# Patient Record
Sex: MALE | Race: WHITE | NOT HISPANIC OR LATINO | Employment: FULL TIME | URBAN - METROPOLITAN AREA
[De-identification: names, ages, dates, MRNs, and addresses within clinical notes are randomized per-mention and may not be internally consistent; named-entity substitution may affect disease eponyms.]

---

## 2020-06-09 ENCOUNTER — OFFICE VISIT (OUTPATIENT)
Dept: ADMISSIONS | Facility: MEDICAL CENTER | Age: 40
End: 2020-06-09
Attending: ORTHOPAEDIC SURGERY
Payer: COMMERCIAL

## 2020-06-09 DIAGNOSIS — Z01.812 PRE-OPERATIVE LABORATORY EXAMINATION: ICD-10-CM

## 2020-06-09 LAB — COVID ORDER STATUS COVID19: NORMAL

## 2020-06-09 PROCEDURE — C9803 HOPD COVID-19 SPEC COLLECT: HCPCS

## 2020-06-09 RX ORDER — BUPRENORPHINE AND NALOXONE 8; 2 MG/1; MG/1
FILM, SOLUBLE BUCCAL; SUBLINGUAL
COMMUNITY
End: 2020-06-09

## 2020-06-09 RX ORDER — TESTOSTERONE CYPIONATE 1000 MG/10ML
150 INJECTION, SOLUTION INTRAMUSCULAR
COMMUNITY

## 2020-06-09 RX ORDER — BUPRENORPHINE HYDROCHLORIDE AND NALOXONE HYDROCHLORIDE .7; .18 MG/1; MG/1
TABLET, ORALLY DISINTEGRATING SUBLINGUAL
COMMUNITY
End: 2020-06-09

## 2020-06-09 RX ORDER — IBUPROFEN 200 MG
200 TABLET ORAL EVERY 6 HOURS PRN
COMMUNITY
End: 2020-12-02

## 2020-06-09 RX ORDER — CLONIDINE HYDROCHLORIDE 0.1 MG/1
TABLET ORAL PRN
COMMUNITY
Start: 2020-05-17

## 2020-06-09 NOTE — OR NURSING
"Preadmit appointment: \" Preparing for your Procedure information\" sheet given to patient with verbal and written instructions. Patient instructed to continue prescribed medications through the day before surgery, instructed to take the following medications the day of surgery per anesthesia protocol: none.  Pt denies any diagnosis of HTN, pt states he was prescribed Clonidine when he was taken off Zubsolv but he currently will take 1/2 tab to help him sleep.  Pt states he has been off Zubsolv/ Suboxone since last summer. Pt to have covid testing as soon as he leaves preadmit,  Pt advised he needs to self isolate and to notify Dr. Mitchell if any symptoms arise and covid symptoms sheet given to pt  "

## 2020-06-11 ENCOUNTER — ANESTHESIA EVENT (OUTPATIENT)
Dept: SURGERY | Facility: MEDICAL CENTER | Age: 40
End: 2020-06-11
Payer: COMMERCIAL

## 2020-06-11 LAB
SARS-COV-2 RNA RESP QL NAA+PROBE: NOT DETECTED
SPECIMEN SOURCE: NORMAL

## 2020-06-12 ENCOUNTER — ANESTHESIA (OUTPATIENT)
Dept: SURGERY | Facility: MEDICAL CENTER | Age: 40
End: 2020-06-12
Payer: COMMERCIAL

## 2020-06-12 ENCOUNTER — HOSPITAL ENCOUNTER (OUTPATIENT)
Facility: MEDICAL CENTER | Age: 40
End: 2020-06-12
Attending: ORTHOPAEDIC SURGERY | Admitting: ORTHOPAEDIC SURGERY
Payer: COMMERCIAL

## 2020-06-12 VITALS
WEIGHT: 282.19 LBS | DIASTOLIC BLOOD PRESSURE: 74 MMHG | BODY MASS INDEX: 36.22 KG/M2 | RESPIRATION RATE: 18 BRPM | OXYGEN SATURATION: 96 % | SYSTOLIC BLOOD PRESSURE: 124 MMHG | HEIGHT: 74 IN | HEART RATE: 71 BPM | TEMPERATURE: 97.9 F

## 2020-06-12 PROCEDURE — 160002 HCHG RECOVERY MINUTES (STAT): Performed by: ORTHOPAEDIC SURGERY

## 2020-06-12 PROCEDURE — 700105 HCHG RX REV CODE 258: Performed by: ORTHOPAEDIC SURGERY

## 2020-06-12 PROCEDURE — 700111 HCHG RX REV CODE 636 W/ 250 OVERRIDE (IP): Performed by: ANESTHESIOLOGY

## 2020-06-12 PROCEDURE — 64415 NJX AA&/STRD BRCH PLXS IMG: CPT | Performed by: ORTHOPAEDIC SURGERY

## 2020-06-12 PROCEDURE — 501838 HCHG SUTURE GENERAL: Performed by: ORTHOPAEDIC SURGERY

## 2020-06-12 PROCEDURE — A9270 NON-COVERED ITEM OR SERVICE: HCPCS | Performed by: ANESTHESIOLOGY

## 2020-06-12 PROCEDURE — 160046 HCHG PACU - 1ST 60 MINS PHASE II: Performed by: ORTHOPAEDIC SURGERY

## 2020-06-12 PROCEDURE — 160035 HCHG PACU - 1ST 60 MINS PHASE I: Performed by: ORTHOPAEDIC SURGERY

## 2020-06-12 PROCEDURE — 700111 HCHG RX REV CODE 636 W/ 250 OVERRIDE (IP): Performed by: ORTHOPAEDIC SURGERY

## 2020-06-12 PROCEDURE — 160029 HCHG SURGERY MINUTES - 1ST 30 MINS LEVEL 4: Performed by: ORTHOPAEDIC SURGERY

## 2020-06-12 PROCEDURE — 160048 HCHG OR STATISTICAL LEVEL 1-5: Performed by: ORTHOPAEDIC SURGERY

## 2020-06-12 PROCEDURE — 700101 HCHG RX REV CODE 250: Performed by: ANESTHESIOLOGY

## 2020-06-12 PROCEDURE — 160036 HCHG PACU - EA ADDL 30 MINS PHASE I: Performed by: ORTHOPAEDIC SURGERY

## 2020-06-12 PROCEDURE — 700101 HCHG RX REV CODE 250

## 2020-06-12 PROCEDURE — 502581 HCHG PACK, SHOULDER ARTHROSCOPY: Performed by: ORTHOPAEDIC SURGERY

## 2020-06-12 PROCEDURE — 160009 HCHG ANES TIME/MIN: Performed by: ORTHOPAEDIC SURGERY

## 2020-06-12 PROCEDURE — 700102 HCHG RX REV CODE 250 W/ 637 OVERRIDE(OP): Performed by: ANESTHESIOLOGY

## 2020-06-12 PROCEDURE — 160025 RECOVERY II MINUTES (STATS): Performed by: ORTHOPAEDIC SURGERY

## 2020-06-12 PROCEDURE — 160041 HCHG SURGERY MINUTES - EA ADDL 1 MIN LEVEL 4: Performed by: ORTHOPAEDIC SURGERY

## 2020-06-12 RX ORDER — LIDOCAINE HYDROCHLORIDE 10 MG/ML
INJECTION, SOLUTION INFILTRATION; PERINEURAL
Status: COMPLETED
Start: 2020-06-12 | End: 2020-06-12

## 2020-06-12 RX ORDER — OXYCODONE HCL 5 MG/5 ML
10 SOLUTION, ORAL ORAL
Status: COMPLETED | OUTPATIENT
Start: 2020-06-12 | End: 2020-06-12

## 2020-06-12 RX ORDER — SODIUM CHLORIDE, SODIUM LACTATE, POTASSIUM CHLORIDE, CALCIUM CHLORIDE 600; 310; 30; 20 MG/100ML; MG/100ML; MG/100ML; MG/100ML
INJECTION, SOLUTION INTRAVENOUS CONTINUOUS
Status: DISCONTINUED | OUTPATIENT
Start: 2020-06-12 | End: 2020-06-12 | Stop reason: HOSPADM

## 2020-06-12 RX ORDER — MIDAZOLAM HYDROCHLORIDE 1 MG/ML
INJECTION INTRAMUSCULAR; INTRAVENOUS PRN
Status: DISCONTINUED | OUTPATIENT
Start: 2020-06-12 | End: 2020-06-12 | Stop reason: SURG

## 2020-06-12 RX ORDER — EPINEPHRINE 1 MG/ML(1)
AMPUL (ML) INJECTION
Status: DISCONTINUED | OUTPATIENT
Start: 2020-06-12 | End: 2020-06-12 | Stop reason: HOSPADM

## 2020-06-12 RX ORDER — HYDROMORPHONE HYDROCHLORIDE 1 MG/ML
0.4 INJECTION, SOLUTION INTRAMUSCULAR; INTRAVENOUS; SUBCUTANEOUS
Status: DISCONTINUED | OUTPATIENT
Start: 2020-06-12 | End: 2020-06-12 | Stop reason: HOSPADM

## 2020-06-12 RX ORDER — ONDANSETRON 2 MG/ML
4 INJECTION INTRAMUSCULAR; INTRAVENOUS
Status: DISCONTINUED | OUTPATIENT
Start: 2020-06-12 | End: 2020-06-12 | Stop reason: HOSPADM

## 2020-06-12 RX ORDER — MEPERIDINE HYDROCHLORIDE 25 MG/ML
12.5 INJECTION INTRAMUSCULAR; INTRAVENOUS; SUBCUTANEOUS
Status: DISCONTINUED | OUTPATIENT
Start: 2020-06-12 | End: 2020-06-12 | Stop reason: HOSPADM

## 2020-06-12 RX ORDER — HYDRALAZINE HYDROCHLORIDE 20 MG/ML
5 INJECTION INTRAMUSCULAR; INTRAVENOUS
Status: DISCONTINUED | OUTPATIENT
Start: 2020-06-12 | End: 2020-06-12 | Stop reason: HOSPADM

## 2020-06-12 RX ORDER — LIDOCAINE HYDROCHLORIDE 20 MG/ML
INJECTION, SOLUTION EPIDURAL; INFILTRATION; INTRACAUDAL; PERINEURAL PRN
Status: DISCONTINUED | OUTPATIENT
Start: 2020-06-12 | End: 2020-06-12 | Stop reason: SURG

## 2020-06-12 RX ORDER — HALOPERIDOL 5 MG/ML
1 INJECTION INTRAMUSCULAR
Status: DISCONTINUED | OUTPATIENT
Start: 2020-06-12 | End: 2020-06-12 | Stop reason: HOSPADM

## 2020-06-12 RX ORDER — HYDROMORPHONE HYDROCHLORIDE 1 MG/ML
0.5 INJECTION, SOLUTION INTRAMUSCULAR; INTRAVENOUS; SUBCUTANEOUS
Status: DISCONTINUED | OUTPATIENT
Start: 2020-06-12 | End: 2020-06-12 | Stop reason: HOSPADM

## 2020-06-12 RX ORDER — BUPIVACAINE HYDROCHLORIDE 2.5 MG/ML
INJECTION, SOLUTION EPIDURAL; INFILTRATION; INTRACAUDAL PRN
Status: DISCONTINUED | OUTPATIENT
Start: 2020-06-12 | End: 2020-06-12 | Stop reason: SURG

## 2020-06-12 RX ORDER — MIDAZOLAM HYDROCHLORIDE 1 MG/ML
1 INJECTION INTRAMUSCULAR; INTRAVENOUS
Status: DISCONTINUED | OUTPATIENT
Start: 2020-06-12 | End: 2020-06-12 | Stop reason: HOSPADM

## 2020-06-12 RX ORDER — DIPHENHYDRAMINE HYDROCHLORIDE 50 MG/ML
12.5 INJECTION INTRAMUSCULAR; INTRAVENOUS
Status: DISCONTINUED | OUTPATIENT
Start: 2020-06-12 | End: 2020-06-12 | Stop reason: HOSPADM

## 2020-06-12 RX ORDER — OXYCODONE HCL 5 MG/5 ML
5 SOLUTION, ORAL ORAL
Status: COMPLETED | OUTPATIENT
Start: 2020-06-12 | End: 2020-06-12

## 2020-06-12 RX ORDER — CEFAZOLIN SODIUM 1 G/3ML
INJECTION, POWDER, FOR SOLUTION INTRAMUSCULAR; INTRAVENOUS PRN
Status: DISCONTINUED | OUTPATIENT
Start: 2020-06-12 | End: 2020-06-12 | Stop reason: SURG

## 2020-06-12 RX ORDER — HYDROMORPHONE HYDROCHLORIDE 1 MG/ML
0.2 INJECTION, SOLUTION INTRAMUSCULAR; INTRAVENOUS; SUBCUTANEOUS
Status: DISCONTINUED | OUTPATIENT
Start: 2020-06-12 | End: 2020-06-12 | Stop reason: HOSPADM

## 2020-06-12 RX ORDER — DEXAMETHASONE SODIUM PHOSPHATE 4 MG/ML
INJECTION, SOLUTION INTRA-ARTICULAR; INTRALESIONAL; INTRAMUSCULAR; INTRAVENOUS; SOFT TISSUE PRN
Status: DISCONTINUED | OUTPATIENT
Start: 2020-06-12 | End: 2020-06-12 | Stop reason: SURG

## 2020-06-12 RX ORDER — ONDANSETRON 2 MG/ML
INJECTION INTRAMUSCULAR; INTRAVENOUS PRN
Status: DISCONTINUED | OUTPATIENT
Start: 2020-06-12 | End: 2020-06-12 | Stop reason: SURG

## 2020-06-12 RX ADMIN — PROPOFOL 200 MG: 10 INJECTION, EMULSION INTRAVENOUS at 13:53

## 2020-06-12 RX ADMIN — LIDOCAINE HYDROCHLORIDE 100 MG: 20 INJECTION, SOLUTION EPIDURAL; INFILTRATION; INTRACAUDAL; PERINEURAL at 13:53

## 2020-06-12 RX ADMIN — Medication 0.5 ML: at 13:29

## 2020-06-12 RX ADMIN — FENTANYL CITRATE 100 MCG: 50 INJECTION, SOLUTION INTRAMUSCULAR; INTRAVENOUS at 13:53

## 2020-06-12 RX ADMIN — ONDANSETRON 4 MG: 2 INJECTION INTRAMUSCULAR; INTRAVENOUS at 14:45

## 2020-06-12 RX ADMIN — CEFAZOLIN 3 G: 1 INJECTION, POWDER, FOR SOLUTION INTRAVENOUS at 13:49

## 2020-06-12 RX ADMIN — FENTANYL CITRATE 50 MCG: 50 INJECTION INTRAMUSCULAR; INTRAVENOUS at 15:23

## 2020-06-12 RX ADMIN — DEXAMETHASONE SODIUM PHOSPHATE 2 MG: 4 INJECTION, SOLUTION INTRAMUSCULAR; INTRAVENOUS at 13:40

## 2020-06-12 RX ADMIN — BUPIVACAINE HYDROCHLORIDE 30 ML: 2.5 INJECTION, SOLUTION EPIDURAL; INFILTRATION; INTRACAUDAL; PERINEURAL at 13:40

## 2020-06-12 RX ADMIN — OXYCODONE HYDROCHLORIDE 10 MG: 5 SOLUTION ORAL at 15:22

## 2020-06-12 RX ADMIN — LIDOCAINE HYDROCHLORIDE 0.5 ML: 10 INJECTION, SOLUTION INFILTRATION; PERINEURAL at 13:29

## 2020-06-12 RX ADMIN — MIDAZOLAM HYDROCHLORIDE 2 MG: 1 INJECTION, SOLUTION INTRAMUSCULAR; INTRAVENOUS at 13:38

## 2020-06-12 RX ADMIN — FENTANYL CITRATE 100 MCG: 50 INJECTION, SOLUTION INTRAMUSCULAR; INTRAVENOUS at 14:12

## 2020-06-12 RX ADMIN — SODIUM CHLORIDE, POTASSIUM CHLORIDE, SODIUM LACTATE AND CALCIUM CHLORIDE: 600; 310; 30; 20 INJECTION, SOLUTION INTRAVENOUS at 14:45

## 2020-06-12 RX ADMIN — SODIUM CHLORIDE, POTASSIUM CHLORIDE, SODIUM LACTATE AND CALCIUM CHLORIDE: 600; 310; 30; 20 INJECTION, SOLUTION INTRAVENOUS at 13:39

## 2020-06-12 ASSESSMENT — PAIN SCALES - GENERAL: PAIN_LEVEL: 0

## 2020-06-12 NOTE — ANESTHESIA PROCEDURE NOTES
Peripheral Block    Date/Time: 6/12/2020 1:38 PM  Performed by: Rg Clarke M.D.  Authorized by: Rg Clarke M.D.     Patient Location:  Pre-op  Start Time:  6/12/2020 1:38 PM  End Time:  6/12/2020 1:42 PM  Reason for Block: at surgeon's request and post-op pain management    patient identified, IV checked, site marked, risks and benefits discussed, surgical consent, monitors and equipment checked, pre-op evaluation and timeout performed    Patient Position:  Sitting  Prep: ChloraPrep    Monitoring:  Heart rate, continuous pulse ox and cardiac monitor  Block Region:  Upper Extremity  Upper Extremity - Block Type:  BRACHIAL PLEXUS block, Supraclavicular approach    Laterality:  Right  Procedures: ultrasound guided  Image captured, interpreted and electronically stored.  Local Infiltration:  Lidocaine  Strength:  1 %  Dose:  3 ml  Block Type:  Single-shot  Needle Length:  100mm  Needle Gauge:  21 G  Needle Localization:  Ultrasound guidance  Injection Assessment:  Negative aspiration for heme, no paresthesia on injection, incremental injection and local visualized surrounding nerve on ultrasound  Evidence of intravascular injection: No     US Guided Supraclavicular Brachial Plexus Block    US transducer placed cephalad and parallel to clavicle in angle to view the subclavian artery with the brachial plexus lateral and superficial to the artery. Needle inserted lateral to the transducer in-plane and advanced with the needle tip visualized continually into the perineural position. After negative aspiration, LA injected without resistance.

## 2020-06-12 NOTE — ANESTHESIA PROCEDURE NOTES
Airway    Date/Time: 6/12/2020 1:54 PM  Performed by: Rg Clarke M.D.  Authorized by: Rg Clarke M.D.     Location:  OR  Urgency:  Elective  Indications for Airway Management:  Anesthesia      Spontaneous Ventilation: absent    Sedation Level:  Deep  Preoxygenated: Yes    Final Airway Type:  Supraglottic airway  Final Supraglottic Airway:  Standard LMA    SGA Size:  4  Number of Attempts at Approach:  1   Atraumatic insertion, good seal

## 2020-06-12 NOTE — OR NURSING
1455 Into pacu via gurney, sedated, no airway but anesthesia put oral airway in red for pt breathing ease. No response from pt with this action. Rt shoulder dressing c/d/i, simple sling inplace, fingers pink and warm with cap refill time q 3 sec return, head or gurney elevated to 30 degrees.ice to shoulder   1505 aroused spontaneously airway removed without incident.   1515 tolerating water without incident head of gurney hi fowlers.    1520 medicated for pain oral oxycodone and fentanyl iv.   1540 resting comfortably looking out the window   1605 meet criteria for dc pacu

## 2020-06-12 NOTE — ANESTHESIA TIME REPORT
Anesthesia Start and Stop Event Times     Date Time Event    6/12/2020 1334 Ready for Procedure     1349 Anesthesia Start     1457 Anesthesia Stop        Responsible Staff  06/12/20    Name Role Begin End    Rg Clarke M.D. Anesth 1349 1453        Preop Diagnosis (Free Text):  Pre-op Diagnosis     BICEPS TENDINITIS RIGHT        Preop Diagnosis (Codes):    Post op Diagnosis  Impingement syndrome of right shoulder      Premium Reason  Non-Premium    Comments:

## 2020-06-12 NOTE — DISCHARGE INSTRUCTIONS
ACTIVITY: Rest and take it easy for the first 24 hours.  A responsible adult is recommended to remain with you during that time.  It is normal to feel sleepy.  We encourage you to not do anything that requires balance, judgment or coordination.    MILD FLU-LIKE SYMPTOMS ARE NORMAL. YOU MAY EXPERIENCE GENERALIZED MUSCLE ACHES, THROAT IRRITATION, HEADACHE AND/OR SOME NAUSEA.    FOR 24 HOURS DO NOT:  Drive, operate machinery or run household appliances.  Drink beer or alcoholic beverages.   Make important decisions or sign legal documents.    SPECIAL INSTRUCTIONS: Post-Operative Instructions - Shoulder Arthroscopy       Dressing and wound care: Keep your shoulder dressing clean and dry after surgery.  Be aware that some leaking of blood or fluid from your dressing can occur and is normal. You may remove your dressing 3 days after the operation.  Notice that you have a single incision and/or several small incisions that have been closed with sutures. Cover each of these incisions with a light dressing or band-aids.  This keeps the surgical incisions clean, as well as preventing your clothes from spotting with blood or fluid.  Change band-aids or light dressing daily.     Shower / bathing: Keep the shoulder dry for 3 days after your surgery.  Then, you may shower. You may let soap and water run over skin incisions, but do not immerse your shoulder in water.  No swimming pools, hot tubs, or baths are recommended until at least 6 weeks after surgery.     Ice: Apply an ice pack to your shoulder (15 minutes on the shoulder, 15 minutes off the shoulder), as you feel necessary to help with the pain and swelling.   If you have a cold therapy, use liberally as directed.     Sling / Shoulder Immobilizer: The sling should be on except when bathing or doing your exercises.  You may also carefully remove the sling when awake and seated.  Be sure to support your forearm with a pillow so your shoulder remains in a relaxed and  comfortable position.  Please wear the sling while sleeping.     Activity: It is important to move your shoulder, as well as your elbow, wrist, and hand several times daily, starting the day after surgery.  You may do pendulum exercises with your operative arm starting the day after surgery.  Pendulum (dangling [place]) exercises are encouraged 3-5 times daily.  The sling will need to be removed for pendulum exercises.  You may loosen the sling to use your operative arm for light simple activities such as eating, brushing teeth, or writing/typing.  Nothing heavier than a cup of coffee in your hand on the operative side until otherwise recommended.     Pain medication: Take your pain medicine, as needed and prescribed.  Do not drive or operate machinery while taking narcotic pain medication.   You may start or resume anti-inflammatory medication (i.e. ibuprofen, naproxen) anytime after surgery, which should be taken with food to avoid stomach irritation.     Driving: You may drive as soon as you are off narcotic pain medication and feel comfortable behind the wheel.  Loosen or remove your sling, supporting your forearm on a pillow when you drive.  It is important for both hands to have access to the steering wheel for safety.     Aspirin: Take one low dose 81 mg aspirin starting the morning after surgery twice daily - one in the morning and one in the evening - for two weeks following surgery.       Problems:     If you are having problems with your shoulder (unexpected pain, excessive bleeding or discharge from your incisions, fevers/chills) do not hesitate to call the office or visit the nearest emergency room for evaluation.     Follow-up:     Make sure that you have an appointment 7-14 days following surgery.  Your stitches will be removed, and your procedure/rehabilitation will be discussed at that time.   Physical therapy may be prescribed at that time.         Rachel Mitchell MD   [facility]    [phone]   DIET: To avoid nausea, slowly advance diet as tolerated, avoiding spicy or greasy foods for the first day.  Add more substantial food to your diet according to your physician's instructions.  Babies can be fed formula or breast milk as soon as they are hungry.  INCREASE FLUIDS AND FIBER TO AVOID CONSTIPATION.        FOLLOW-UP APPOINTMENT:  A follow-up appointment should be arranged with your doctor in ***; call to schedule.    You should CALL YOUR PHYSICIAN if you develop:  Fever greater than 101 degrees F.  Pain not relieved by medication, or persistent nausea or vomiting.  Excessive bleeding (blood soaking through dressing) or unexpected drainage from the wound.  Extreme redness or swelling around the incision site, drainage of pus or foul smelling drainage.  Inability to urinate or empty your bladder within 8 hours.  Problems with breathing or chest pain.    You should call 911 if you develop problems with breathing or chest pain.  If you are unable to contact your doctor or surgical center, you should go to the nearest emergency room or urgent care center.  Physician's telephone #: Dr Mitchell    If any questions arise, call your doctor.  If your doctor is not available, please feel free to call the Surgical Center at [phone].  The Center is open Monday through Friday from 7AM to 7PM.  You can also call the [facility] HOTLINE open 24 hours/day, 7 days/week and speak to a nurse at [phone], or toll free at [phone].    A registered nurse may call you a few days after your surgery to see how you are doing after your procedure.    MEDICATIONS: Resume taking daily medication.  Take prescribed pain medication with food.  If no medication is prescribed, you may take non-aspirin pain medication if needed.  PAIN MEDICATION CAN BE VERY CONSTIPATING.  Take a stool softener or laxative such as senokot, pericolace, or milk of magnesia if needed.    Prescription given for prescriptions given  prior to surgery 1520 oxycodone and fentanyl 50    If your physician has prescribed pain medication that includes Acetaminophen (Tylenol), do not take additional Acetaminophen (Tylenol) while taking the prescribed medication.    Depression / Suicide Risk    As you are discharged from this Critical access hospital facility, it is important to learn how to keep safe from harming yourself.    Recognize the warning signs:  · Abrupt changes in personality, positive or negative- including increase in energy   · Giving away possessions  · Change in eating patterns- significant weight changes-  positive or negative  · Change in sleeping patterns- unable to sleep or sleeping all the time   · Unwillingness or inability to communicate  · Depression  · Unusual sadness, discouragement and loneliness  · Talk of wanting to die  · Neglect of personal appearance   · Rebelliousness- reckless behavior  · Withdrawal from people/activities they love  · Confusion- inability to concentrate     If you or a loved one observes any of these behaviors or has concerns about self-harm, here's what you can do:  · Talk about it- your feelings and reasons for harming yourself  · Remove any means that you might use to hurt yourself (examples: pills, rope, extension cords, firearm)  · Get professional help from the community (Mental Health, Substance Abuse, psychological counseling)  · Do not be alone:Call your Safe Contact- someone whom you trust who will be there for you.  · Call your local CRISIS HOTLINE 495-2265 or 892-097-5201  · Call your local Children's Mobile Crisis Response Team [place] (935) 176-7828 or www.eTruck  · Call the toll free National Suicide Prevention Hotlines   · National Suicide Prevention Lifeline 411-781-FRBG (9274)  National Hope Line Network 800-SUICIDE (008-1079)    Peripheral Nerve Block Discharge Instructions from Same Day Surgery and Inpatient :    What to Expect - Lower Extremity  · The block may cause you to  experience numbness and weakness in your {LEG LOCATION PNB:232119} on the same side as your surgery  · Numbness, tingling and / or weakness are all normal. For some people, this may be an unpleasant sensation  · These issues will be resolved when the local anesthetic wears off   · You may experience numbness and tingling in your thigh on the same side as your surgery if the block medicine was injected at your groin area  · Numbness will make it difficult to walk  · You may have problems with balance and walking so be very careful   · Follow your surgeon's direction regarding weight bearing on your surgical limb  · Be very careful with your numb limb  Precautions  · The numbness may affect your balance  · Be careful when walking or moving around  · Your leg may be weak: be very careful putting weight on it  · If your surgeon did not specify a time, you should not bear weight for 24 hours  · Be sure to ask for help when you need it  · It is better to have help than to fall and hurt yourself  What to Expect - Upper Extremity  · You may experience numbness and weakness in {ARM LOCATION PNB:634517}  on the same side as your surgery  · This is normal. For some people, this may be an unpleasant sensation. Be very careful with your numb limb  · Ask for help when you need it  Shoulder Surgery Side Effects  · In addition to numbness and weakness you may experience other symptoms  · Other nerves that are close to those nerves injected can also be affected by local anesthesia  · You may experience a hoarseness in your voice  · Your breathing may feel different  · You may also notice drooping of your eyelid, pupil constriction, and decreased sweating, on the side of your surgery  · All of these side effects are normal and will resolve when the local anesthetic wears off   Prevent Injury  · Protect the limb like a baby  · Beware of exposing your limb to extreme heat or cold or trauma  · The limb may be injured without you  "noticing because it is numb  · Keep the limb elevated whenever possible  · Do not sleep on the limb  · Change the position of the limb regularly  · Avoid putting pressure on your surgical limb  Pain Control  · The initial block on the day of surgery will make your extremity feel \"numb\"  · Any consecutive injection including prior to discharge from the hospital will make your extremity feel \"numb\"  · You may feel an aching or burning when the local anesthesia starts to wear off  · Take pain pills as prescribed by your surgeon  · Call your surgeon or anesthesiologist if you do not have adequate pain control  ·   "

## 2020-06-12 NOTE — ANESTHESIA PREPROCEDURE EVALUATION
HTN, prior GA without issues. Denies: MI/CHF/smoking/CVA/DM/CKD      Relevant Problems   No relevant active problems       Physical Exam    Airway   Mallampati: II  TM distance: >3 FB  Neck ROM: full       Cardiovascular - normal exam  Rhythm: regular  Rate: normal  (-) murmur     Dental - normal exam           Pulmonary - normal exam  Breath sounds clear to auscultation     Abdominal    Neurological - normal exam                 Anesthesia Plan    ASA 2       Plan - general and peripheral nerve block     Peripheral nerve block will be post-op pain control  Airway plan will be LMA        Induction: intravenous    Postoperative Plan: Postoperative administration of opioids is intended.    Pertinent diagnostic labs and testing reviewed    Informed Consent:    Anesthetic plan and risks discussed with patient.    Use of blood products discussed with: patient whom consented to blood products.

## 2020-06-12 NOTE — ANESTHESIA POSTPROCEDURE EVALUATION
Patient: Ozzy Boland    Procedure Summary     Date:  06/12/20 Room / Location:   OR  / SURGERY [facility]    Anesthesia Start:  1349 Anesthesia Stop:  1457    Procedures:       DECOMPRESSION, SHOULDER, ARTHROSCOPIC - SUBACROMIAL, LABRAL DEBRIDEMENT (Right Shoulder)      EXCISION, CLAVICLE, DISTAL - (Right Shoulder) Diagnosis:  (BICEPS TENDINITIS RIGHT)    Surgeon:  Rachel Mitchell M.D. Responsible Provider:  Rg Clarke M.D.    Anesthesia Type:  general, peripheral nerve block ASA Status:  2          Final Anesthesia Type: general, peripheral nerve block  Last vitals  BP   Blood Pressure: 136/77    Temp   36.6 °C (97.9 °F)    Pulse   Pulse: 65   Resp   16    SpO2   97 %      Anesthesia Post Evaluation    Patient location during evaluation: PACU  Patient participation: complete - patient participated  Level of consciousness: awake and alert  Pain score: 0    Airway patency: patent  Anesthetic complications: no  Cardiovascular status: hemodynamically stable  Respiratory status: acceptable  Hydration status: euvolemic    PONV: none           Nurse Pain Score: 0 (NPRS)

## 2020-06-12 NOTE — OR SURGEON
Immediate Post OP Note    PreOp Diagnosis: RIGHT shoulder impingement, AC joint OA, labral debridement    PostOp Diagnosis: SAME     Procedure(s): RIGHT  DECOMPRESSION, SHOULDER, ARTHROSCOPIC - SUBACROMIAL, LABRAL DEBRIDEMENT - Wound Class: Clean  EXCISION, CLAVICLE, DISTAL - - Wound Class: Clean    Surgeon(s):  Rachel Mitchell M.D.    Anesthesiologist/Type of Anesthesia:  Anesthesiologist: Rg Clarke M.D.  Anesthesia Technician: Terry Llanos/General    Surgical Staff:  Circulator: Analisa Welch R.N.  Scrub Person: Ana Marx  Private Scrub: BONNIE BrandtNIZAIAH    Specimens removed if any:  * No specimens in log *    Estimated Blood Loss: min    Findings: as above    Complications: none            6/12/2020 2:53 PM Rachel Mitchell M.D.

## 2020-06-12 NOTE — OR NURSING
1420- Patient dressed and resting in recliner chair. Reports tolerable soreness to right shoulder. Sling in place. Family called to bedside.  1640- Discharged to care of family.

## 2020-06-13 NOTE — OP REPORT
DATE OF SERVICE:  06/12/2020    PREOPERATIVE DIAGNOSES:  Right shoulder impingement syndrome,   acromioclavicular joint osteoarthritis, superior labrum anterior and posterior   lesion.    POSTOPERATIVE DIAGNOSES:  Right shoulder impingement syndrome,   acromioclavicular joint osteoarthritis, type 1 superior labrum anterior to   posterior lesion.    PROCEDURES:  Right shoulder arthroscopy, subacromial decompression,   arthroscopic distal clavicle excision, labral debridement.    SURGEON:  Rachel Mitchell MD.    ASSISTANT:  Brendan Ortega CFA.    ANESTHESIOLOGIST:  Rg Clarke MD.    TYPE OF ANESTHESIA:  General, with preoperative interscalene nerve block.    INTRAVENOUS FLUID:  One liter crystalloid.    ESTIMATED BLOOD LOSS:  Minimal.    DRAINS:  None.    SPECIMENS:  None.    COMPLICATIONS:  None.    IMPLANTS:  None.    REASON FOR PROCEDURE:  The patient is a 40-year-old male with a longstanding   history of right shoulder pain.  He has failed nonoperative measures.  An MRI   demonstrated rotator cuff impingement with a possible SLAP lesion.  We decided   to proceed with arthroscopy.    DESCRIPTION OF OPERATION:  The  patient was given a right interscalene nerve   block by the anesthesiologist before surgery.  Once back in the operating   room, a breathing tube was placed.  He was then placed in the lateral   decubitus position.  Care was taken to pad his axilla as well as all bony   prominences.  The right shoulder was then examined under anesthesia.  He was   noted to have good range of motion, without instability.  The right upper   extremity was prepped with ChloraPrep and draped in standard sterile fashion.    A posterior portal was established.  The arthroscope was inserted.  An   anterior portal was established and a probe was then inserted.  There was mild   fraying noted to the proximal biceps tendon.  There was no instability or   significant proximal biceps tendinopathy.  The superior labrum was  simply   debrided.  The undersurface of the rotator cuff was normal.  The glenohumeral   joint cartilage was normal.  The arthroscope was then inserted into the   subacromial space.  There was a copious amount of thickened, inflamed bursal   tissue.  There was a significant curve noted to the acromion, which was noted   to impinge upon the supraspinatus.  There was no rotator cuff tear, however.    The 5.5 mm resector was used to remove the anterior curve as well as lateral   edge.  Portals were switched.  Using a standard cutting block technique from   posterior to anterior, a subacromial decompression was completed.  This   created a nice smooth surface to the undersurface of the acromion.  Attention   was then turned to the rotator cuff below.  There was no rotator cuff tear   that was noted.  Next, attention was turned to the distal clavicle.  There was   a very tight acromioclavicular joint space with some arthritic change noted   at the distal clavicle.  The 5.5 mm resector was used to remove approximately   8 mm of bone off of the distal clavicle, taking care to visualize the entire   aspect of the distal clavicle.  It was undermined as well to treat any medial   outlet obstruction symptoms.  The lateral aspect of the acromioplasty was   completed.  The rotator cuff while scuffed, was not torn.  A near complete   subdeltoid bursectomy was performed.  All instruments were then removed.    Portals were closed with 3-0 nylon suture.  A sterile dressing was applied.    All drapes were removed and the arm was carefully taken out of traction and   placed into a shoulder abduction sling.  The patient was placed supine on a   stretcher and taken to recovery room in stable condition.       ____________________________________     MD NIEVES JONES / ANGELES    DD:  06/12/2020 15:09:23  DT:  06/12/2020 22:32:48    D#:  [id number]  Job#:  [id number]

## 2020-12-02 ENCOUNTER — OFFICE VISIT (OUTPATIENT)
Dept: URGENT CARE | Facility: CLINIC | Age: 40
End: 2020-12-02
Payer: COMMERCIAL

## 2020-12-02 ENCOUNTER — HOSPITAL ENCOUNTER (OUTPATIENT)
Facility: MEDICAL CENTER | Age: 40
End: 2020-12-02
Attending: PHYSICIAN ASSISTANT
Payer: COMMERCIAL

## 2020-12-02 VITALS
TEMPERATURE: 96.9 F | SYSTOLIC BLOOD PRESSURE: 152 MMHG | OXYGEN SATURATION: 97 % | HEIGHT: 74 IN | DIASTOLIC BLOOD PRESSURE: 92 MMHG | RESPIRATION RATE: 18 BRPM | BODY MASS INDEX: 35.29 KG/M2 | HEART RATE: 97 BPM | WEIGHT: 275 LBS

## 2020-12-02 DIAGNOSIS — B34.9 NONSPECIFIC SYNDROME SUGGESTIVE OF VIRAL ILLNESS: ICD-10-CM

## 2020-12-02 DIAGNOSIS — R03.0 ELEVATED BLOOD PRESSURE READING: ICD-10-CM

## 2020-12-02 DIAGNOSIS — R52 GENERALIZED BODY ACHES: ICD-10-CM

## 2020-12-02 DIAGNOSIS — R05.9 COUGH: ICD-10-CM

## 2020-12-02 PROCEDURE — U0003 INFECTIOUS AGENT DETECTION BY NUCLEIC ACID (DNA OR RNA); SEVERE ACUTE RESPIRATORY SYNDROME CORONAVIRUS 2 (SARS-COV-2) (CORONAVIRUS DISEASE [COVID-19]), AMPLIFIED PROBE TECHNIQUE, MAKING USE OF HIGH THROUGHPUT TECHNOLOGIES AS DESCRIBED BY CMS-2020-01-R: HCPCS

## 2020-12-02 PROCEDURE — 99203 OFFICE O/P NEW LOW 30 MIN: CPT | Performed by: PHYSICIAN ASSISTANT

## 2020-12-02 RX ORDER — IBUPROFEN 600 MG/1
600 TABLET ORAL EVERY 6 HOURS PRN
Qty: 30 TAB | Refills: 0 | Status: SHIPPED | OUTPATIENT
Start: 2020-12-02

## 2020-12-02 RX ORDER — DEXTROAMPHETAMINE SACCHARATE, AMPHETAMINE ASPARTATE, DEXTROAMPHETAMINE SULFATE AND AMPHETAMINE SULFATE 2.5; 2.5; 2.5; 2.5 MG/1; MG/1; MG/1; MG/1
TABLET ORAL
COMMUNITY
Start: 2020-11-17 | End: 2023-03-07

## 2020-12-02 RX ORDER — DEXTROMETHORPHAN HYDROBROMIDE AND PROMETHAZINE HYDROCHLORIDE 15; 6.25 MG/5ML; MG/5ML
5 SYRUP ORAL EVERY 4 HOURS PRN
Qty: 100 ML | Refills: 0 | Status: SHIPPED | OUTPATIENT
Start: 2020-12-02 | End: 2023-03-07

## 2020-12-02 ASSESSMENT — ENCOUNTER SYMPTOMS
HEMOPTYSIS: 0
WHEEZING: 0
MYALGIAS: 1
SORE THROAT: 0
RHINORRHEA: 1
FEVER: 1
SHORTNESS OF BREATH: 0
CHILLS: 0
COUGH: 1
HEADACHES: 1

## 2020-12-02 NOTE — PATIENT INSTRUCTIONS
COVID-19 results pending    *Return home and continue self-isolation until you get your test result back.  If positive: You will be called by [facility] staff.    · Stay home and continue self isolation until:  · At least ten (10) days have passed since symptoms first appeared AND  · At least 24 hours have passed from last fever without the use of fever-reducing medications AND  · Symptoms have improved (eg: cough or shortness of breath)    If negative: You will be getting a [employer] message or a letter from [facility].  · Stay home until you have no fever for 24 hours and your symptoms (cough and shortness of breath) have resolved.    You may call [facility] ER Discharge Culture Line at [phone] for results/questions.    *Even after the above are met, maintain social distance from others (at least 6 feet) and practice frequent hand washing.    *Wear a face mask in public places.

## 2020-12-02 NOTE — PROGRESS NOTES
"Subjective:   Ozzy Boland is a 40 y.o. male who presents for Headache (x4days, headache, fever, feels hazy, cough)        Cough  This is a new problem. The current episode started in the past 7 days (after 12hrs of hunting on saturday). The problem has been gradually worsening. The cough is non-productive. Associated symptoms include a fever (subjective and measured at work 100.4), headaches, myalgias, nasal congestion and rhinorrhea. Pertinent negatives include no chills, ear congestion, ear pain, hemoptysis, postnasal drip, sore throat, shortness of breath or wheezing. Associated symptoms comments: Fatigue, \"haziness\". The symptoms are aggravated by cold air. Treatments tried: nsaids. The treatment provided mild relief. His past medical history is significant for asthma.     Review of Systems   Constitutional: Positive for fever (subjective and measured at work 100.4). Negative for chills.   HENT: Positive for rhinorrhea. Negative for ear pain, postnasal drip and sore throat.    Respiratory: Positive for cough. Negative for hemoptysis, shortness of breath and wheezing.    Musculoskeletal: Positive for myalgias.   Neurological: Positive for headaches.       PMH:  has a past medical history of Arthritis and Unspecified asthma(493.90).  MEDS:   Current Outpatient Medications:   •  amphetamine-dextroamphetamine (ADDERALL) 10 MG Tab, TK 1 T PO QAM AND TK 1 T AT NOON FOR 30 DAYS, Disp: , Rfl:   •  promethazine-dextromethorphan (PROMETHAZINE-DM) 6.25-15 MG/5ML syrup, Take 5 mL by mouth every four hours as needed for Cough., Disp: 100 mL, Rfl: 0  •  ibuprofen (MOTRIN) 600 MG Tab, Take 1 Tab by mouth every 6 hours as needed., Disp: 30 Tab, Rfl: 0  •  cloNIDine (CATAPRES) 0.1 MG Tab, as needed. 1/2 tab pt reports initially taken when taken off Zubsolv but currently takes \"to help me sleep\" very occasionally, Disp: , Rfl:   •  Testosterone Cypionate 100 MG/ML Solution, 150 mg by Intramuscular route., Disp: , Rfl: " "  •  Turmeric Curcumin 500 MG Cap, every day., Disp: , Rfl:   •  diclofenac (SOLARAZE) 3 % gel, diclofenac 3 % topical gel  APPLY TO AFFECTED AREAS R SHOULDER Q4-6 hrs PRN, Disp: , Rfl:   •  NON SPECIFIED, every day. Beet root, Disp: , Rfl:   •  Flaxseed, Linseed, (FLAX SEEDS PO), Take  by mouth every day., Disp: , Rfl:   •  Probiotic Product (PROBIOTIC PO), Take  by mouth every day., Disp: , Rfl:   ALLERGIES:   Allergies   Allergen Reactions   • Fish Allergy Anaphylaxis     Tuna fish- throat swelling   • Shellfish Allergy Anaphylaxis   • Ketorolac Hives   • Tizanidine Hives   • Tramadol Itching and Hives     SURGHX:   Past Surgical History:   Procedure Laterality Date   • PB SHLDR ARTHROSCOP,PART ACROMIOPLAS Right 6/12/2020    Procedure: DECOMPRESSION, SHOULDER, ARTHROSCOPIC - SUBACROMIAL, LABRAL DEBRIDEMENT;  Surgeon: Rachel Mitchell M.D.;  Location: SURGERY [facility];  Service: Orthopedics   • CLAVICLE DISTAL EXCISION Right 6/12/2020    Procedure: EXCISION, CLAVICLE, DISTAL -;  Surgeon: Rachel Mitchell M.D.;  Location: SURGERY [facility];  Service: Orthopedics   • CARPAL TUNNEL RELEASE Right 2006   • KNEE ARTHROSCOPY Left 2002   • KNEE ARTHROSCOPY Right 2001, 2003    x2 right     SOCHX:  reports that he quit smoking about 4 years ago. His smoking use included cigars. He has a 15.00 pack-year smoking history. He quit smokeless tobacco use about 23 months ago.  His smokeless tobacco use included chew. He reports previous alcohol use. He reports current drug use.  FH: Family history was reviewed, no pertinent findings to report   Objective:   /92 (BP Location: Left arm, Patient Position: Sitting, BP Cuff Size: Adult)   Pulse 97   Temp 36.1 °C (96.9 °F) (Temporal)   Resp 18   Ht 1.88 m (6' 2\")   Wt 124.7 kg (275 lb)   SpO2 97%   BMI 35.31 kg/m²   Physical Exam  Vitals signs reviewed.   Constitutional:       General: He is not in acute distress.     Appearance: Normal appearance. He is " well-developed. He is not toxic-appearing.   HENT:      Head: Normocephalic and atraumatic.      Right Ear: Tympanic membrane, ear canal and external ear normal.      Left Ear: Tympanic membrane, ear canal and external ear normal.      Nose: Mucosal edema and rhinorrhea present. No congestion. Rhinorrhea is clear.      Mouth/Throat:      Lips: Pink.      Mouth: Mucous membranes are moist.      Pharynx: Oropharynx is clear.   Eyes:      General: Gaze aligned appropriately.   Cardiovascular:      Rate and Rhythm: Normal rate and regular rhythm.      Heart sounds: Normal heart sounds, S1 normal and S2 normal.   Pulmonary:      Effort: Pulmonary effort is normal. No respiratory distress.      Breath sounds: Normal breath sounds. No stridor. No decreased breath sounds, wheezing, rhonchi or rales.   Skin:     General: Skin is warm and dry.      Capillary Refill: Capillary refill takes less than 2 seconds.   Neurological:      Mental Status: He is alert and oriented to person, place, and time.      Comments: CN2-12 grossly intact   Psychiatric:         Speech: Speech normal.         Behavior: Behavior normal.           Assessment/Plan:   1. Nonspecific syndrome suggestive of viral illness  - COVID/SARS COV-2 PCR; Future    2. Cough  - promethazine-dextromethorphan (PROMETHAZINE-DM) 6.25-15 MG/5ML syrup; Take 5 mL by mouth every four hours as needed for Cough.  Dispense: 100 mL; Refill: 0  - COVID/SARS COV-2 PCR; Future    3. Generalized body aches  - ibuprofen (MOTRIN) 600 MG Tab; Take 1 Tab by mouth every 6 hours as needed.  Dispense: 30 Tab; Refill: 0  - COVID/SARS COV-2 PCR; Future    4. Elevated blood pressure reading    Other orders  - amphetamine-dextroamphetamine (ADDERALL) 10 MG Tab; TK 1 T PO QAM AND TK 1 T AT NOON FOR 30 DAYS    Discussed with patient signs and symptoms most likely are due to a viral etiology.     Test for COVID-19. We will call/message back for results and appropriate further instructions.  Instructed to sign up for MyChart if they have not already. Result will be released to MyChart application.   Symptomatic and supportive care:   Plenty of oral hydration and rest   Over the counter cough suppressant as directed.  Tylenol or ibuprofen for pain and fever as directed.   Saline nasal spray and Flonase as a decongestant.   Infection control measures at home. Stay away from people, Hand washing, covering sneeze/cough, disinfect surfaces.   Remain home from work, school, and other populated environments.     Differential diagnosis, natural history, supportive care, and indications for immediate follow-up discussed.

## 2020-12-03 DIAGNOSIS — R52 GENERALIZED BODY ACHES: ICD-10-CM

## 2020-12-03 DIAGNOSIS — R05.9 COUGH: ICD-10-CM

## 2020-12-03 DIAGNOSIS — B34.9 NONSPECIFIC SYNDROME SUGGESTIVE OF VIRAL ILLNESS: ICD-10-CM

## 2020-12-03 LAB — COVID ORDER STATUS COVID19: NORMAL

## 2020-12-04 LAB
SARS-COV-2 RNA RESP QL NAA+PROBE: DETECTED
SPECIMEN SOURCE: ABNORMAL

## 2023-02-28 ENCOUNTER — OFFICE VISIT (OUTPATIENT)
Dept: URGENT CARE | Facility: PHYSICIAN GROUP | Age: 43
End: 2023-02-28
Payer: COMMERCIAL

## 2023-02-28 VITALS
SYSTOLIC BLOOD PRESSURE: 130 MMHG | BODY MASS INDEX: 34.01 KG/M2 | OXYGEN SATURATION: 98 % | DIASTOLIC BLOOD PRESSURE: 88 MMHG | RESPIRATION RATE: 16 BRPM | HEIGHT: 74 IN | HEART RATE: 96 BPM | WEIGHT: 265 LBS | TEMPERATURE: 97.3 F

## 2023-02-28 DIAGNOSIS — R51.9 NONINTRACTABLE HEADACHE, UNSPECIFIED CHRONICITY PATTERN, UNSPECIFIED HEADACHE TYPE: ICD-10-CM

## 2023-02-28 DIAGNOSIS — M25.551 RIGHT HIP PAIN: ICD-10-CM

## 2023-02-28 DIAGNOSIS — S16.1XXA ACUTE STRAIN OF NECK MUSCLE, INITIAL ENCOUNTER: ICD-10-CM
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
## 2023-02-28 DIAGNOSIS — S39.012A STRAIN OF LUMBAR REGION, INITIAL ENCOUNTER: ICD-10-CM

## 2023-02-28 PROCEDURE — 99214 OFFICE O/P EST MOD 30 MIN: CPT | Performed by: PHYSICIAN ASSISTANT

## 2023-02-28 RX ORDER — DEXTROAMPHETAMINE SACCHARATE, AMPHETAMINE ASPARTATE, DEXTROAMPHETAMINE SULFATE AND AMPHETAMINE SULFATE 7.5; 7.5; 7.5; 7.5 MG/1; MG/1; MG/1; MG/1
1 TABLET ORAL 2 TIMES DAILY
COMMUNITY
Start: 2023-02-03

## 2023-02-28 RX ORDER — IBUPROFEN 600 MG/1
600 TABLET ORAL EVERY 6 HOURS PRN
Qty: 30 TABLET | Refills: 0 | Status: SHIPPED | OUTPATIENT
Start: 2023-02-28 | End: 2023-03-07

## 2023-02-28 ASSESSMENT — ENCOUNTER SYMPTOMS
VOMITING: 0
HEADACHES: 1
DIZZINESS: 0
NAUSEA: 0
MYALGIAS: 1
BACK PAIN: 1
NECK PAIN: 1
DOUBLE VISION: 0
BLURRED VISION: 0
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
## 2023-02-28 NOTE — LETTER
[facility] [place]  [address]  [place] [id number]     February 28, 2023    Patient: Ozzy Boland   YOB: 1980   Date of Visit: 2/28/2023       To Whom It May Concern:    Ozzy Boland was seen and treated in our department on 2/28/2023. I recommend light duty for the rest of the week.    Sincerely,     Billy Gonsalez P.A.-C.

## 2023-03-07 ENCOUNTER — OFFICE VISIT (OUTPATIENT)
Dept: URGENT CARE | Facility: PHYSICIAN GROUP | Age: 43
End: 2023-03-07
Payer: COMMERCIAL

## 2023-03-07 VITALS
HEART RATE: 68 BPM | TEMPERATURE: 97.8 F | SYSTOLIC BLOOD PRESSURE: 134 MMHG | OXYGEN SATURATION: 95 % | BODY MASS INDEX: 34.01 KG/M2 | WEIGHT: 265 LBS | RESPIRATION RATE: 20 BRPM | DIASTOLIC BLOOD PRESSURE: 80 MMHG | HEIGHT: 74 IN

## 2023-03-07 DIAGNOSIS — G44.311 INTRACTABLE ACUTE POST-TRAUMATIC HEADACHE: ICD-10-CM

## 2023-03-07 PROCEDURE — 99214 OFFICE O/P EST MOD 30 MIN: CPT | Performed by: PHYSICIAN ASSISTANT

## 2023-03-07 RX ORDER — TESTOSTERONE CYPIONATE 1000 MG/10ML
150 INJECTION, SOLUTION INTRAMUSCULAR
COMMUNITY
End: 2023-03-07

## 2023-03-07 ASSESSMENT — ENCOUNTER SYMPTOMS
VOMITING: 0
MEMORY LOSS: 1
WEAKNESS: 1
NUMBNESS: 1
HEADACHES: 1
BLURRED VISION: 1
DISORIENTATION: 1
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
## 2023-03-16 ENCOUNTER — TELEPHONE (OUTPATIENT)
Dept: HEALTH INFORMATION MANAGEMENT | Facility: OTHER | Age: 43
End: 2023-03-16
Payer: COMMERCIAL

## 2023-10-28 ENCOUNTER — OFFICE VISIT (OUTPATIENT)
Dept: URGENT CARE | Facility: PHYSICIAN GROUP | Age: 43
End: 2023-10-28
Payer: COMMERCIAL

## 2023-10-28 VITALS
DIASTOLIC BLOOD PRESSURE: 92 MMHG | RESPIRATION RATE: 16 BRPM | OXYGEN SATURATION: 95 % | TEMPERATURE: 98 F | SYSTOLIC BLOOD PRESSURE: 146 MMHG | HEART RATE: 71 BPM

## 2023-10-28 DIAGNOSIS — K04.7 DENTAL INFECTION: ICD-10-CM

## 2023-10-28 DIAGNOSIS — K08.89 PAIN, DENTAL: ICD-10-CM

## 2023-10-28 PROCEDURE — 3077F SYST BP >= 140 MM HG: CPT | Performed by: NURSE PRACTITIONER

## 2023-10-28 PROCEDURE — 3080F DIAST BP >= 90 MM HG: CPT | Performed by: NURSE PRACTITIONER

## 2023-10-28 PROCEDURE — 99213 OFFICE O/P EST LOW 20 MIN: CPT | Performed by: NURSE PRACTITIONER

## 2023-10-28 RX ORDER — HYDROCODONE BITARTRATE AND ACETAMINOPHEN 5; 325 MG/1; MG/1
1 TABLET ORAL EVERY 6 HOURS PRN
Qty: 12 TABLET | Refills: 0 | Status: SHIPPED | OUTPATIENT
Start: 2023-10-28 | End: 2023-10-31

## 2023-10-28 RX ORDER — AMOXICILLIN AND CLAVULANATE POTASSIUM 875; 125 MG/1; MG/1
1 TABLET, FILM COATED ORAL 2 TIMES DAILY
Qty: 14 TABLET | Refills: 0 | Status: SHIPPED | OUTPATIENT
Start: 2023-10-28 | End: 2023-11-04

## 2023-10-28 ASSESSMENT — VISUAL ACUITY: OU: 1

## 2023-10-28 ASSESSMENT — ENCOUNTER SYMPTOMS
CONSTITUTIONAL NEGATIVE: 1
FEVER: 0
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
## 2024-07-02 ENCOUNTER — OFFICE VISIT (OUTPATIENT)
Dept: URGENT CARE | Facility: PHYSICIAN GROUP | Age: 44
End: 2024-07-02
Payer: COMMERCIAL

## 2024-07-02 ENCOUNTER — HOSPITAL ENCOUNTER (EMERGENCY)
Facility: MEDICAL CENTER | Age: 44
End: 2024-07-02
Attending: EMERGENCY MEDICINE
Payer: COMMERCIAL

## 2024-07-02 ENCOUNTER — APPOINTMENT (OUTPATIENT)
Dept: RADIOLOGY | Facility: MEDICAL CENTER | Age: 44
End: 2024-07-02
Attending: EMERGENCY MEDICINE
Payer: COMMERCIAL

## 2024-07-02 VITALS
TEMPERATURE: 98.1 F | DIASTOLIC BLOOD PRESSURE: 101 MMHG | SYSTOLIC BLOOD PRESSURE: 197 MMHG | HEIGHT: 74 IN | BODY MASS INDEX: 35.29 KG/M2 | HEART RATE: 72 BPM | RESPIRATION RATE: 13 BRPM | WEIGHT: 275 LBS | OXYGEN SATURATION: 93 %

## 2024-07-02 VITALS
WEIGHT: 279.8 LBS | RESPIRATION RATE: 16 BRPM | BODY MASS INDEX: 35.91 KG/M2 | OXYGEN SATURATION: 98 % | HEIGHT: 74 IN | DIASTOLIC BLOOD PRESSURE: 90 MMHG | HEART RATE: 94 BPM | SYSTOLIC BLOOD PRESSURE: 168 MMHG | TEMPERATURE: 98.6 F

## 2024-07-02 DIAGNOSIS — R03.0 ELEVATED BLOOD PRESSURE READING: ICD-10-CM

## 2024-07-02 DIAGNOSIS — R07.89 CHEST PRESSURE: ICD-10-CM

## 2024-07-02 DIAGNOSIS — R07.89 CHEST TIGHTNESS: ICD-10-CM

## 2024-07-02 PROBLEM — Z87.09 HISTORY OF ASTHMA: Status: ACTIVE | Noted: 2018-06-21

## 2024-07-02 PROBLEM — Z98.890 STATUS POST MENISCECTOMY: Status: ACTIVE | Noted: 2018-06-21

## 2024-07-02 PROBLEM — F17.200 TOBACCO DEPENDENCE: Status: ACTIVE | Noted: 2018-06-21

## 2024-07-02 PROBLEM — Z87.39 HISTORY OF LOW BACK PAIN: Status: ACTIVE | Noted: 2018-06-21

## 2024-07-02 PROBLEM — G89.29 CHRONIC PAIN: Status: ACTIVE | Noted: 2019-02-13

## 2024-07-02 PROBLEM — R79.89 LOW TESTOSTERONE IN MALE: Status: ACTIVE | Noted: 2018-06-21

## 2024-07-02 LAB
ALBUMIN SERPL BCP-MCNC: 4.3 G/DL (ref 3.2–4.9)
ALBUMIN/GLOB SERPL: 1.4 G/DL
ALP SERPL-CCNC: 101 U/L (ref 30–99)
ALT SERPL-CCNC: 33 U/L (ref 2–50)
ANION GAP SERPL CALC-SCNC: 13 MMOL/L (ref 7–16)
AST SERPL-CCNC: 28 U/L (ref 12–45)
BASOPHILS # BLD AUTO: 1.1 % (ref 0–1.8)
BASOPHILS # BLD: 0.09 K/UL (ref 0–0.12)
BILIRUB SERPL-MCNC: 0.5 MG/DL (ref 0.1–1.5)
BUN SERPL-MCNC: 12 MG/DL (ref 8–22)
CALCIUM ALBUM COR SERPL-MCNC: 9.1 MG/DL (ref 8.5–10.5)
CALCIUM SERPL-MCNC: 9.3 MG/DL (ref 8.5–10.5)
CHLORIDE SERPL-SCNC: 101 MMOL/L (ref 96–112)
CO2 SERPL-SCNC: 26 MMOL/L (ref 20–33)
CREAT SERPL-MCNC: 1.22 MG/DL (ref 0.5–1.4)
EKG 4674: NORMAL
EKG IMPRESSION: NORMAL
EOSINOPHIL # BLD AUTO: 0.23 K/UL (ref 0–0.51)
EOSINOPHIL NFR BLD: 2.9 % (ref 0–6.9)
ERYTHROCYTE [DISTWIDTH] IN BLOOD BY AUTOMATED COUNT: 44.4 FL (ref 35.9–50)
GFR SERPLBLD CREATININE-BSD FMLA CKD-EPI: 74 ML/MIN/1.73 M 2
GLOBULIN SER CALC-MCNC: 3 G/DL (ref 1.9–3.5)
GLUCOSE SERPL-MCNC: 111 MG/DL (ref 65–99)
HCT VFR BLD AUTO: 48.3 % (ref 42–52)
HGB BLD-MCNC: 16.3 G/DL (ref 14–18)
IMM GRANULOCYTES # BLD AUTO: 0.01 K/UL (ref 0–0.11)
IMM GRANULOCYTES NFR BLD AUTO: 0.1 % (ref 0–0.9)
LYMPHOCYTES # BLD AUTO: 2.58 K/UL (ref 1–4.8)
LYMPHOCYTES NFR BLD: 32.6 % (ref 22–41)
MCH RBC QN AUTO: 29.6 PG (ref 27–33)
MCHC RBC AUTO-ENTMCNC: 33.7 G/DL (ref 32.3–36.5)
MCV RBC AUTO: 87.8 FL (ref 81.4–97.8)
MONOCYTES # BLD AUTO: 0.58 K/UL (ref 0–0.85)
MONOCYTES NFR BLD AUTO: 7.3 % (ref 0–13.4)
NEUTROPHILS # BLD AUTO: 4.42 K/UL (ref 1.82–7.42)
NEUTROPHILS NFR BLD: 56 % (ref 44–72)
NRBC # BLD AUTO: 0 K/UL
NRBC BLD-RTO: 0 /100 WBC (ref 0–0.2)
PLATELET # BLD AUTO: 312 K/UL (ref 164–446)
PMV BLD AUTO: 9.8 FL (ref 9–12.9)
POTASSIUM SERPL-SCNC: 3.5 MMOL/L (ref 3.6–5.5)
PROT SERPL-MCNC: 7.3 G/DL (ref 6–8.2)
RBC # BLD AUTO: 5.5 M/UL (ref 4.7–6.1)
SODIUM SERPL-SCNC: 140 MMOL/L (ref 135–145)
TROPONIN T SERPL-MCNC: 10 NG/L (ref 6–19)
TROPONIN T SERPL-MCNC: 11 NG/L (ref 6–19)
WBC # BLD AUTO: 7.9 K/UL (ref 4.8–10.8)

## 2024-07-02 PROCEDURE — 700111 HCHG RX REV CODE 636 W/ 250 OVERRIDE (IP): Mod: JZ | Performed by: EMERGENCY MEDICINE

## 2024-07-02 PROCEDURE — 36415 COLL VENOUS BLD VENIPUNCTURE: CPT

## 2024-07-02 PROCEDURE — 84484 ASSAY OF TROPONIN QUANT: CPT | Mod: 91

## 2024-07-02 PROCEDURE — 99214 OFFICE O/P EST MOD 30 MIN: CPT | Performed by: NURSE PRACTITIONER

## 2024-07-02 PROCEDURE — 71045 X-RAY EXAM CHEST 1 VIEW: CPT

## 2024-07-02 PROCEDURE — 99285 EMERGENCY DEPT VISIT HI MDM: CPT

## 2024-07-02 PROCEDURE — 96374 THER/PROPH/DIAG INJ IV PUSH: CPT

## 2024-07-02 PROCEDURE — 93005 ELECTROCARDIOGRAM TRACING: CPT | Performed by: EMERGENCY MEDICINE

## 2024-07-02 PROCEDURE — 3077F SYST BP >= 140 MM HG: CPT | Performed by: NURSE PRACTITIONER

## 2024-07-02 PROCEDURE — 80053 COMPREHEN METABOLIC PANEL: CPT

## 2024-07-02 PROCEDURE — 85025 COMPLETE CBC W/AUTO DIFF WBC: CPT

## 2024-07-02 PROCEDURE — 93005 ELECTROCARDIOGRAM TRACING: CPT

## 2024-07-02 PROCEDURE — 96375 TX/PRO/DX INJ NEW DRUG ADDON: CPT

## 2024-07-02 PROCEDURE — 3080F DIAST BP >= 90 MM HG: CPT | Performed by: NURSE PRACTITIONER

## 2024-07-02 RX ORDER — LABETALOL HYDROCHLORIDE 5 MG/ML
10 INJECTION, SOLUTION INTRAVENOUS ONCE
Status: COMPLETED | OUTPATIENT
Start: 2024-07-02 | End: 2024-07-02

## 2024-07-02 RX ORDER — HYDRALAZINE HYDROCHLORIDE 20 MG/ML
10 INJECTION INTRAMUSCULAR; INTRAVENOUS ONCE
Status: COMPLETED | OUTPATIENT
Start: 2024-07-02 | End: 2024-07-02

## 2024-07-02 RX ADMIN — LABETALOL HYDROCHLORIDE 10 MG: 5 INJECTION, SOLUTION INTRAVENOUS at 19:01

## 2024-07-02 RX ADMIN — HYDRALAZINE HYDROCHLORIDE 10 MG: 20 INJECTION, SOLUTION INTRAMUSCULAR; INTRAVENOUS at 17:23

## 2024-07-02 ASSESSMENT — HEART SCORE
HEART SCORE: 2
RISK FACTORS: 1-2 RISK FACTORS
ECG: NORMAL
AGE: 45-64
HISTORY: SLIGHTLY SUSPICIOUS
TROPONIN: LESS THAN OR EQUAL TO NORMAL LIMIT

## 2024-12-29 ENCOUNTER — APPOINTMENT (OUTPATIENT)
Dept: RADIOLOGY | Facility: IMAGING CENTER | Age: 44
End: 2024-12-29
Attending: NURSE PRACTITIONER
Payer: COMMERCIAL

## 2024-12-29 ENCOUNTER — OFFICE VISIT (OUTPATIENT)
Dept: URGENT CARE | Facility: PHYSICIAN GROUP | Age: 44
End: 2024-12-29
Payer: COMMERCIAL

## 2024-12-29 VITALS
HEIGHT: 74 IN | TEMPERATURE: 96.9 F | RESPIRATION RATE: 18 BRPM | SYSTOLIC BLOOD PRESSURE: 150 MMHG | BODY MASS INDEX: 37.67 KG/M2 | DIASTOLIC BLOOD PRESSURE: 88 MMHG | WEIGHT: 293.5 LBS | HEART RATE: 89 BPM | OXYGEN SATURATION: 91 %

## 2024-12-29 DIAGNOSIS — M54.2 NECK PAIN: ICD-10-CM
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
## 2024-12-29 DIAGNOSIS — M54.50 LUMBAR PAIN: ICD-10-CM

## 2024-12-29 DIAGNOSIS — M25.552 BILATERAL HIP PAIN: ICD-10-CM

## 2024-12-29 DIAGNOSIS — M25.551 BILATERAL HIP PAIN: ICD-10-CM

## 2024-12-29 DIAGNOSIS — Z87.898 HISTORY OF CHRONIC PAIN: ICD-10-CM

## 2024-12-29 PROCEDURE — 72100 X-RAY EXAM L-S SPINE 2/3 VWS: CPT | Mod: TC | Performed by: RADIOLOGY

## 2024-12-29 PROCEDURE — 73521 X-RAY EXAM HIPS BI 2 VIEWS: CPT | Mod: TC | Performed by: RADIOLOGY

## 2024-12-29 PROCEDURE — 72040 X-RAY EXAM NECK SPINE 2-3 VW: CPT | Mod: TC | Performed by: RADIOLOGY

## 2024-12-29 RX ORDER — METHYLPREDNISOLONE 4 MG/1
TABLET ORAL
Qty: 21 TABLET | Refills: 0 | Status: SHIPPED | OUTPATIENT
Start: 2024-12-29

## 2024-12-29 RX ORDER — HYDROCODONE BITARTRATE AND ACETAMINOPHEN 5; 325 MG/1; MG/1
1 TABLET ORAL EVERY 6 HOURS PRN
Qty: 12 TABLET | Refills: 0 | Status: SHIPPED | OUTPATIENT
Start: 2024-12-29 | End: 2025-01-01

## 2024-12-29 ASSESSMENT — FIBROSIS 4 INDEX: FIB4 SCORE: 0.72

## 2024-12-29 ASSESSMENT — ENCOUNTER SYMPTOMS
FEVER: 0
HEADACHES: 0
LOSS OF CONSCIOUSNESS: 0
BACK PAIN: 1
NECK PAIN: 1
ABDOMINAL PAIN: 0
CHILLS: 0
MYALGIAS: 1
TINGLING: 0
SENSORY CHANGE: 0

## 2024-12-29 NOTE — LETTER
December 29, 2024        Ozzy Boland  [address] [place] [address]        Ozzy was seen in our clinic today and he is excused from work for his next three days. Thank you.   If you have any questions or concerns, please don't hesitate to call.        Sincerely,        ALVIN Powell.    Electronically Signed
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
## 2025-01-21 ENCOUNTER — TELEPHONE (OUTPATIENT)
Dept: HEALTH INFORMATION MANAGEMENT | Facility: OTHER | Age: 45
End: 2025-01-21
Payer: COMMERCIAL

## 2025-04-14 ENCOUNTER — OFFICE VISIT (OUTPATIENT)
Dept: URGENT CARE | Facility: PHYSICIAN GROUP | Age: 45
End: 2025-04-14
Payer: COMMERCIAL

## 2025-04-14 VITALS
OXYGEN SATURATION: 92 % | WEIGHT: 283 LBS | HEIGHT: 74 IN | DIASTOLIC BLOOD PRESSURE: 82 MMHG | SYSTOLIC BLOOD PRESSURE: 160 MMHG | TEMPERATURE: 99.2 F | RESPIRATION RATE: 20 BRPM | BODY MASS INDEX: 36.32 KG/M2 | HEART RATE: 83 BPM

## 2025-04-14 DIAGNOSIS — J01.10 ACUTE NON-RECURRENT FRONTAL SINUSITIS: ICD-10-CM

## 2025-04-14 DIAGNOSIS — R03.0 ELEVATED BLOOD PRESSURE READING IN OFFICE WITHOUT DIAGNOSIS OF HYPERTENSION: ICD-10-CM

## 2025-04-14 PROCEDURE — 3077F SYST BP >= 140 MM HG: CPT

## 2025-04-14 PROCEDURE — 3079F DIAST BP 80-89 MM HG: CPT

## 2025-04-14 PROCEDURE — 99214 OFFICE O/P EST MOD 30 MIN: CPT

## 2025-04-14 RX ORDER — DEXTROMETHORPHAN HYDROBROMIDE AND PROMETHAZINE HYDROCHLORIDE 15; 6.25 MG/5ML; MG/5ML
5 SYRUP ORAL
Qty: 25 ML | Refills: 0 | Status: SHIPPED | OUTPATIENT
Start: 2025-04-14 | End: 2025-04-19

## 2025-04-14 RX ORDER — FLUTICASONE PROPIONATE 50 MCG
2 SPRAY, SUSPENSION (ML) NASAL
Qty: 16 G | Refills: 1 | Status: SHIPPED | OUTPATIENT
Start: 2025-04-14

## 2025-04-14 ASSESSMENT — ENCOUNTER SYMPTOMS
CHILLS: 0
SHORTNESS OF BREATH: 0
COUGH: 1
WEAKNESS: 0
FEVER: 0
EYE PAIN: 0
SINUS PAIN: 0
DOUBLE VISION: 0
HEADACHES: 0
TINGLING: 0
BLURRED VISION: 0
DIZZINESS: 0

## 2025-04-14 ASSESSMENT — FIBROSIS 4 INDEX: FIB4 SCORE: 0.73

## 2025-04-14 NOTE — PROGRESS NOTES
Chief Complaint   Patient presents with    Cough     Sinus,x2 weeks       HISTORY OF PRESENT ILLNESS: Patient is a pleasant 45 y.o. male who presents to urgent care today ongoing complaints of cold-like symptoms for the last 2 weeks with increasing cough, sinus pain and pressure with nasal congestion.  Patient denies any fevers, no major shortness of breath.  He states he has been sick off and on for the last month.  He has been taking OTC medications with little to no relief.    Patient Active Problem List    Diagnosis Date Noted    Chronic pain 02/13/2019    History of asthma 06/21/2018    History of low back pain 06/21/2018    Low testosterone in male 06/21/2018    Status post meniscectomy 06/21/2018    Tobacco dependence 06/21/2018    Disorder of intervertebral disc of lumbar spine 08/09/2016    Spondylosis without myelopathy 04/11/2016    Muscle pain 01/18/2016    Obesity with body mass index 30 or greater 01/18/2016    Pain in the coccyx 01/18/2016    Neck pain 05/12/2014    Low back pain 05/12/2014       Allergies:Fish allergy, Shellfish allergy, Ketorolac, Tizanidine, and Tramadol    Current Outpatient Medications Ordered in Epic   Medication Sig Dispense Refill    amoxicillin-clavulanate (AUGMENTIN) 875-125 MG Tab Take 1 Tablet by mouth 2 times a day for 5 days. 10 Tablet 0    fluticasone (FLONASE) 50 MCG/ACT nasal spray Administer 2 Sprays into affected nostril(S) at bedtime. 16 g 1    promethazine-dextromethorphan (PROMETHAZINE-DM) 6.25-15 MG/5ML syrup Take 5 mL by mouth 1 time a day as needed for Cough (at night only) for up to 5 days. 25 mL 0    methylPREDNISolone (MEDROL DOSEPAK) 4 MG Tablet Therapy Pack Follow schedule on package instructions. (Patient not taking: Reported on 4/14/2025) 21 Tablet 0     No current Epic-ordered facility-administered medications on file.       Past Medical History:   Diagnosis Date    ADD (attention deficit disorder)     Arthritis     back/ knees    Unspecified  "asthma(493.90)     as a child       Social History     Tobacco Use    Smoking status: Former     Current packs/day: 0.00     Average packs/day: 1 pack/day for 15.0 years (15.0 ttl pk-yrs)     Types: Cigars, Cigarettes     Start date:      Quit date: 2016     Years since quittin.2    Smokeless tobacco: Former     Types: Chew     Quit date:     Tobacco comments:     18 currently smoke cigar once every 2 weeks   Vaping Use    Vaping status: Former   Substance Use Topics    Alcohol use: Not Currently     Comment: rare    Drug use: Yes     Comment: Kratom for pain       No family status information on file.   History reviewed. No pertinent family history.    Review of Systems   Constitutional:  Negative for chills, fever and malaise/fatigue.   HENT:  Positive for congestion. Negative for ear pain and sinus pain.    Eyes:  Negative for blurred vision, double vision and pain.   Respiratory:  Positive for cough. Negative for shortness of breath.    Neurological:  Negative for dizziness, tingling, weakness and headaches.       Exam:  BP (!) 160/82   Pulse 83   Temp 37.3 °C (99.2 °F) (Temporal)   Resp 20   Ht 1.88 m (6' 2\")   Wt (!) 128 kg (283 lb)   SpO2 92%   Physical Exam  Vitals reviewed.   Constitutional:       Appearance: Normal appearance. He is obese.   HENT:      Head: Normocephalic.      Right Ear: Tympanic membrane and ear canal normal. There is no impacted cerumen. Tympanic membrane is not injected or erythematous.      Left Ear: Tympanic membrane and ear canal normal. There is no impacted cerumen. Tympanic membrane is not injected or erythematous.      Nose: Congestion present. No rhinorrhea.      Mouth/Throat:      Mouth: Mucous membranes are moist.      Pharynx: Oropharynx is clear. No oropharyngeal exudate or posterior oropharyngeal erythema.      Tonsils: No tonsillar exudate. 0 on the right. 0 on the left.   Eyes:      General:         Right eye: No discharge.         Left eye: No " discharge.      Extraocular Movements: Extraocular movements intact.      Conjunctiva/sclera: Conjunctivae normal.      Pupils: Pupils are equal, round, and reactive to light.   Cardiovascular:      Rate and Rhythm: Normal rate and regular rhythm.      Pulses: Normal pulses.      Heart sounds: Normal heart sounds. No murmur heard.     Comments: Elevated blood pressure of 160/82  Pulmonary:      Effort: Pulmonary effort is normal. No respiratory distress.      Breath sounds: Normal breath sounds. No stridor. No wheezing.      Comments: Noted coarse cough  Musculoskeletal:         General: Normal range of motion.      Cervical back: Normal range of motion.   Lymphadenopathy:      Cervical: No cervical adenopathy.   Skin:     General: Skin is warm and dry.      Capillary Refill: Capillary refill takes less than 2 seconds.      Findings: No bruising or rash.   Neurological:      General: No focal deficit present.      Mental Status: He is alert.      Sensory: No sensory deficit.      Motor: No weakness.   Psychiatric:         Mood and Affect: Mood normal.         Behavior: Behavior normal.         Thought Content: Thought content normal.         Judgment: Judgment normal.             Assessment/Plan:  1. Acute non-recurrent frontal sinusitis  - amoxicillin-clavulanate (AUGMENTIN) 875-125 MG Tab; Take 1 Tablet by mouth 2 times a day for 5 days.  Dispense: 10 Tablet; Refill: 0  - fluticasone (FLONASE) 50 MCG/ACT nasal spray; Administer 2 Sprays into affected nostril(S) at bedtime.  Dispense: 16 g; Refill: 1  - promethazine-dextromethorphan (PROMETHAZINE-DM) 6.25-15 MG/5ML syrup; Take 5 mL by mouth 1 time a day as needed for Cough (at night only) for up to 5 days.  Dispense: 25 mL; Refill: 0    2. Elevated blood pressure reading in office without diagnosis of hypertension  - Referral to establish with PCP    Based on physical exam along with review of systems I did provide Augmentin today as patient has been sick for over  a week with no relief from over-the-counter cold medications.  Medications in the form of Flonase sent as well.  Patient advised take medication with food, drink plenty of fluids.  Patient is requesting an antiviral today here in the office, this request was declined as patient has been sick for what appears to be well over 2 weeks given his history however his story vacillated upon further questioning, from 1 week to 1 month.  Regardless it does not appear that an antiviral would be useful at this time, I did advise that I did not feel comfortable providing this medication today here in the office.  Patient has been sick for well over a week, indicating more of a need for antibiotics.    Patient has noted elevated hypertension today here in the office.  They are not currently on medications for this.  Patient is on testosterone therapy, he believes this is the contributing factor.  I advised him to please make an appointment with a primary care provider as soon as possible.  Education provided on uncontrolled hypertension as well as the need to keep a log.  Referral was placed for primary care, patient advised to follow-up with them and present to the log for ongoing management.  Patient currently denies any symptoms associated with hypertension.    Supportive care, differential diagnoses, and indications for immediate follow-up discussed with patient.   Pathogenesis of diagnosis discussed including typical length and natural progression.   Instructed to return to clinic or nearest emergency department for any change in condition, further concerns, or worsening of symptoms.  Patient states understanding of the plan of care and discharge instructions.  Instructed to make an appointment, for follow up, with primary care provider.    Please note that this dictation was created using voice recognition software. I have made every reasonable attempt to correct obvious errors, but I expect that there are errors of grammar  and possibly content that I did not discover before finalizing the note.      Kimberly Jamil APRN

## 2025-04-14 NOTE — LETTER
April 14, 2025    To Whom It May Concern:         This is confirmation that Ozzy Marcelina Krystian attended his scheduled appointment with DULCE Johansen on 4/14/25. Please excuse from any missed work.           If you have any questions please do not hesitate to call me at the phone number listed below.    Sincerely,          BILL Johansen.  [phone]

## (undated) DEVICE — ELECTRODE 850 FOAM ADHESIVE - HYDROGEL RADIOTRNSPRNT (50/PK)

## (undated) DEVICE — NEEDLE W/FACET TIP DULL VERSION W/STIMULATION CABLE SONOPLEX 21G X 4 (10/EA)"

## (undated) DEVICE — SUTURE 3-0 ETHILON FS-1 - (36/BX) 30 INCH

## (undated) DEVICE — GLOVE BIOGEL SZ 7 SURGICAL PF LTX - (50PR/BX 4BX/CA)

## (undated) DEVICE — TUBING PUMP WITH CONNECTOR REDEUCE (1EA)

## (undated) DEVICE — TUBE CONNECTING SUCTION - CLEAR PLASTIC STERILE 72 IN (50EA/CA)

## (undated) DEVICE — CHLORAPREP 26 ML APPLICATOR - ORANGE TINT(25/CA)

## (undated) DEVICE — LACTATED RINGERS INJ 1000 ML - (14EA/CA 60CA/PF)

## (undated) DEVICE — MASK, LARYNGEAL AIRWAY #4

## (undated) DEVICE — GLOVE 7.0 LF PF PROTEXIS (50PR/BX)

## (undated) DEVICE — HEAD HOLDER JUNIOR/ADULT

## (undated) DEVICE — GOWN SURGICAL X-LARGE ULTRA - FILM-REINFORCED (20/CA)

## (undated) DEVICE — PROTECTOR ULNA NERVE - (36PR/CA)

## (undated) DEVICE — NEPTUNE 4 PORT MANIFOLD - (20/PK)

## (undated) DEVICE — DRAPE SHOULDER FLUID CONTROL - 77 X 85 (10/CA)

## (undated) DEVICE — SYRINGE ASEPTO - (50EA/CA

## (undated) DEVICE — PACK SHOULDER ARTHROSCOPY SM - (2EA/CA)

## (undated) DEVICE — SUCTION INSTRUMENT YANKAUER BULBOUS TIP W/O VENT (50EA/CA)

## (undated) DEVICE — GLOVE, LITE (PAIR)

## (undated) DEVICE — DRAPE IOBAN II INCISE 23X17 - (10EA/BX 4BX/CA)

## (undated) DEVICE — SUTURE GENERAL

## (undated) DEVICE — ELECTRODE DUAL RETURN W/ CORD - (50/PK)

## (undated) DEVICE — SLEEVE SHOULDER DISP(ARTHREX) - (6/BX)

## (undated) DEVICE — BAG SPONGE COUNT 10.25 X 32 - BLUE (250/CA)

## (undated) DEVICE — SODIUM CHL IRRIGATION 0.9% 1000ML (12EA/CA)

## (undated) DEVICE — SHAVER, 5.5 RESECTOR

## (undated) DEVICE — WATER IRRIGATION STERILE 1000ML (12EA/CA)

## (undated) DEVICE — SENSOR SPO2 NEO LNCS ADHESIVE (20/BX) SEE USER NOTES

## (undated) DEVICE — TUBING PATIENT W/CONNECTOR REDEUCE (1EA)

## (undated) DEVICE — MASK ANESTHESIA ADULT  - (100/CA)

## (undated) DEVICE — TOWELS CLOTH SURGICAL - (4/PK 20PK/CA)

## (undated) DEVICE — SODIUM CHL. IRRIGATION 0.9% 3000ML (4EA/CA 65CA/PF)

## (undated) DEVICE — SHAVER4.0 AGGRESSIVE + FORMLA (5EA/BX)

## (undated) DEVICE — GOWN WARMING STANDARD FLEX - (30/CA)

## (undated) DEVICE — CANISTER SUCTION RIGID RED 1500CC (40EA/CA)

## (undated) DEVICE — KIT ANESTHESIA W/CIRCUIT & 3/LT BAG W/FILTER (20EA/CA)

## (undated) DEVICE — DRAPE U SPLIT IMP 54 X 76 - (24/CA)

## (undated) DEVICE — CANNULA THREADED 5X75 (5EA/BX)

## (undated) DEVICE — HUMID-VENT HEAT AND MOISTURE EXCHANGE- (50/BX)

## (undated) DEVICE — ABLATOR WAND SERFAS 90-S CRUISE